# Patient Record
Sex: MALE | Race: WHITE | NOT HISPANIC OR LATINO | Employment: FULL TIME | RURAL
[De-identification: names, ages, dates, MRNs, and addresses within clinical notes are randomized per-mention and may not be internally consistent; named-entity substitution may affect disease eponyms.]

---

## 2018-04-13 ENCOUNTER — OFFICE VISIT (OUTPATIENT)
Dept: FAMILY MEDICINE CLINIC | Facility: CLINIC | Age: 43
End: 2018-04-13

## 2018-04-13 VITALS
OXYGEN SATURATION: 98 % | WEIGHT: 155 LBS | HEIGHT: 66 IN | SYSTOLIC BLOOD PRESSURE: 117 MMHG | TEMPERATURE: 98 F | HEART RATE: 83 BPM | DIASTOLIC BLOOD PRESSURE: 80 MMHG | BODY MASS INDEX: 24.91 KG/M2

## 2018-04-13 DIAGNOSIS — H61.21 IMPACTED CERUMEN, RIGHT EAR: Primary | ICD-10-CM

## 2018-04-13 DIAGNOSIS — J30.9 ALLERGIC RHINITIS, UNSPECIFIED CHRONICITY, UNSPECIFIED SEASONALITY, UNSPECIFIED TRIGGER: ICD-10-CM

## 2018-04-13 PROCEDURE — 99202 OFFICE O/P NEW SF 15 MIN: CPT | Performed by: NURSE PRACTITIONER

## 2018-04-13 NOTE — PATIENT INSTRUCTIONS
Earwax Buildup  Your ears make a substance called earwax. It may also be called cerumen. Sometimes, too much earwax builds up in your ear canal. This can cause ear pain and make it harder for you to hear.  CAUSES  This condition is caused by too much earwax production or buildup.  RISK FACTORS  The following factors may make you more likely to develop this condition:  · Cleaning your ears often with swabs.  · Having narrow ear canals.  · Having earwax that is overly thick or sticky.  · Having eczema.  · Being dehydrated.  SYMPTOMS  Symptoms of this condition include:  · Reduced hearing.  · Ear drainage.  · Ear pain.  · Ear itch.  · A feeling of fullness in the ear or feeling that the ear is plugged.  · Ringing in the ear.  · Coughing.  DIAGNOSIS  Your health care provider can diagnose this condition based on your symptoms and medical history. Your health care provider will also do an ear exam to look inside your ear with a scope (otoscope). You may also have a hearing test.  TREATMENT  Treatment for this condition includes:  · Over-the-counter or prescription ear drops to soften the earwax.  · Earwax removal by a health care provider. This may be done:  ¨ By flushing the ear with body-temperature water.  ¨ With a medical instrument that has a loop at the end (earwax curette).  ¨ With a suction device.  HOME CARE INSTRUCTIONS  · Take over-the-counter and prescription medicines only as told by your health care provider.  · Do not put any objects, including an ear swab, into your ear. You can clean the opening of your ear canal with a washcloth.  · Drink enough water to keep your urine clear or pale yellow.  · If you have frequent earwax buildup or you use hearing aids, consider seeing your health care provider every 6-12 months for routine preventive ear cleanings. Keep all follow-up visits as told by your health care provider.  SEEK MEDICAL CARE IF:  · You have ear pain.  · Your condition does not improve with  treatment.  · You have hearing loss.  · You have blood, pus, or other fluid coming from your ear.  This information is not intended to replace advice given to you by your health care provider. Make sure you discuss any questions you have with your health care provider.  Document Released: 01/25/2006 Document Revised: 04/10/2017 Document Reviewed: 08/03/2016  [employer] Interactive Patient Education © 2017 [employer]  Allergic Rhinitis  Allergic rhinitis is when the mucous membranes in the nose respond to allergens. Allergens are particles in the air that cause your body to have an allergic reaction. This causes you to release allergic antibodies. Through a chain of events, these eventually cause you to release histamine into the blood stream. Although meant to protect the body, it is this release of histamine that causes your discomfort, such as frequent sneezing, congestion, and an itchy, runny nose.  What are the causes?  Seasonal allergic rhinitis (hay fever) is caused by pollen allergens that may come from grasses, trees, and weeds. Year-round allergic rhinitis (perennial allergic rhinitis) is caused by allergens such as house dust mites, pet dander, and mold spores.  What are the signs or symptoms?  · Nasal stuffiness (congestion).  · Itchy, runny nose with sneezing and tearing of the eyes.  How is this diagnosed?  Your health care provider can help you determine the allergen or allergens that trigger your symptoms. If you and your health care provider are unable to determine the allergen, skin or blood testing may be used. Your health care provider will diagnose your condition after taking your health history and performing a physical exam. Your health care provider may assess you for other related conditions, such as asthma, pink eye, or an ear infection.  How is this treated?  Allergic rhinitis does not have a cure, but it can be controlled by:  · Medicines that block allergy symptoms. These may include  allergy shots, nasal sprays, and oral antihistamines.  · Avoiding the allergen.  Hay fever may often be treated with antihistamines in pill or nasal spray forms. Antihistamines block the effects of histamine. There are over-the-counter medicines that may help with nasal congestion and swelling around the eyes. Check with your health care provider before taking or giving this medicine.  If avoiding the allergen or the medicine prescribed do not work, there are many new medicines your health care provider can prescribe. Stronger medicine may be used if initial measures are ineffective. Desensitizing injections can be used if medicine and avoidance does not work. Desensitization is when a patient is given ongoing shots until the body becomes less sensitive to the allergen. Make sure you follow up with your health care provider if problems continue.  Follow these instructions at home:  It is not possible to completely avoid allergens, but you can reduce your symptoms by taking steps to limit your exposure to them. It helps to know exactly what you are allergic to so that you can avoid your specific triggers.  Contact a health care provider if:  · You have a fever.  · You develop a cough that does not stop easily (persistent).  · You have shortness of breath.  · You start wheezing.  · Symptoms interfere with normal daily activities.  This information is not intended to replace advice given to you by your health care provider. Make sure you discuss any questions you have with your health care provider.  Document Released: 09/12/2002 Document Revised: 08/18/2017 Document Reviewed: 08/25/2014  [employer] Patient Education © 2017 [employer]

## 2018-04-13 NOTE — PROGRESS NOTES
Subjective   John Cipriano Cortez is a 42 y.o. male.     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  Occasionally uses q-tips.  Has had some on/off sinus drainage and taking and OTC allergy pill and used Chitra seltzer yesterday which seemed to help.       Ear Fullness    There is pain in the right ear. Chronicity: off on over the years has had wax problems in the right ear, but worse the last 3 days. The problem occurs constantly. The problem has been gradually worsening (in the last 3 days). There has been no fever. The patient is experiencing no pain. Associated symptoms include coughing ( slight). Pertinent negatives include no abdominal pain, diarrhea, ear discharge ( small amount of dark wax removed with tissue), headaches, hearing loss, neck pain, rash, rhinorrhea, sore throat ( scratchy, PND) or vomiting. Treatments tried: peroxide. The treatment provided no relief.        The following portions of the patient's history were reviewed and updated as appropriate: allergies, current medications, past medical history, past social history, past surgical history and problem list.    Review of Systems   Constitutional: Negative for appetite change, chills, fatigue, fever, unexpected weight gain and unexpected weight loss.   HENT: Positive for ear pain ( fullness/pressure on right) and postnasal drip. Negative for congestion ( minimal), ear discharge ( small amount of dark wax removed with tissue), hearing loss, rhinorrhea, sinus pressure, sneezing and sore throat ( scratchy, PND).    Eyes: Negative for pain, discharge and itching.   Respiratory: Positive for cough ( slight). Negative for chest tightness, shortness of breath and wheezing.    Cardiovascular: Negative for chest pain, palpitations and leg swelling.   Gastrointestinal: Negative for abdominal pain, diarrhea, nausea and vomiting.   Musculoskeletal: Negative for neck pain and neck stiffness.   Skin: Negative for rash.   Neurological:  "Negative for headaches.   Hematological: Negative for adenopathy.       Objective    /80 (BP Location: Left arm, Patient Position: Sitting, Cuff Size: Adult)   Pulse 83   Temp 98 °F (36.7 °C) (Tympanic)   Ht 167.6 cm (66\")   Wt 70.3 kg (155 lb)   SpO2 98%   BMI 25.02 kg/m²     Physical Exam   Constitutional: He is oriented to person, place, and time. He appears well-developed and well-nourished. No distress.   HENT:   Head: Normocephalic and atraumatic.   Right Ear: cerumen impaction is present.  Left Ear: Tympanic membrane and ear canal normal.   Nose: Mucosal edema ( ) and congestion ( mild) present. Right sinus exhibits no maxillary sinus tenderness and no frontal sinus tenderness. Left sinus exhibits no maxillary sinus tenderness and no frontal sinus tenderness.   Mouth/Throat: Uvula is midline and mucous membranes are normal. No oropharyngeal exudate, posterior oropharyngeal edema or posterior oropharyngeal erythema.   Debrox drops instilled to right ear.  Irrigation with 1:1 soln of warm water/peroxide performed by MA. Complete removal of cerumen noted.  TM intact with no erythema, no significant effusion noted.     Clear PND   Eyes: Conjunctivae are normal. Right eye exhibits no discharge. Left eye exhibits no discharge.   Cardiovascular: Normal rate and regular rhythm.    Pulmonary/Chest: Effort normal and breath sounds normal. He has no wheezes. He has no rales.   Lymphadenopathy:     He has no cervical adenopathy.   Neurological: He is alert and oriented to person, place, and time.   Nursing note and vitals reviewed.        Assessment/Plan   John was seen today for ear fullness.    Diagnoses and all orders for this visit:    Impacted cerumen, right ear    Allergic rhinitis, unspecified chronicity, unspecified seasonality, unspecified trigger      Continue with otc allergy pill for sinus drainage/congestion as needed  Due to working in viry environment/wearing ear plugs, recommend using Debrox " monthly for 3-4 days to keep wax soft/loose.  Try to avoid use of q-tips in the ear.     F/U here or with PCP for any additional problems/concerns.